# Patient Record
Sex: MALE | Race: BLACK OR AFRICAN AMERICAN | NOT HISPANIC OR LATINO | Employment: FULL TIME | URBAN - METROPOLITAN AREA
[De-identification: names, ages, dates, MRNs, and addresses within clinical notes are randomized per-mention and may not be internally consistent; named-entity substitution may affect disease eponyms.]

---

## 2024-02-20 ENCOUNTER — OFFICE VISIT (OUTPATIENT)
Dept: URGENT CARE | Facility: URGENT CARE | Age: 64
End: 2024-02-20
Payer: COMMERCIAL

## 2024-02-20 VITALS
HEART RATE: 118 BPM | WEIGHT: 184.25 LBS | RESPIRATION RATE: 20 BRPM | OXYGEN SATURATION: 95 % | SYSTOLIC BLOOD PRESSURE: 175 MMHG | TEMPERATURE: 100.4 F | DIASTOLIC BLOOD PRESSURE: 98 MMHG

## 2024-02-20 DIAGNOSIS — R06.2 WHEEZING: Primary | ICD-10-CM

## 2024-02-20 DIAGNOSIS — R05.1 ACUTE COUGH: ICD-10-CM

## 2024-02-20 LAB
FLUAV AG SPEC QL IA: NEGATIVE
FLUBV AG SPEC QL IA: NEGATIVE

## 2024-02-20 PROCEDURE — 99203 OFFICE O/P NEW LOW 30 MIN: CPT

## 2024-02-20 PROCEDURE — 87635 SARS-COV-2 COVID-19 AMP PRB: CPT

## 2024-02-20 PROCEDURE — 87804 INFLUENZA ASSAY W/OPTIC: CPT

## 2024-02-20 RX ORDER — LOSARTAN POTASSIUM 50 MG/1
50 TABLET ORAL DAILY
COMMUNITY
Start: 2023-10-10

## 2024-02-20 RX ORDER — SIMVASTATIN 20 MG
20 TABLET ORAL AT BEDTIME
COMMUNITY
Start: 2023-07-06

## 2024-02-20 RX ORDER — LEVALBUTEROL TARTRATE 45 UG/1
2 AEROSOL, METERED ORAL EVERY 6 HOURS PRN
Qty: 15 G | Refills: 0 | Status: SHIPPED | OUTPATIENT
Start: 2024-02-20

## 2024-02-20 RX ORDER — BENZONATATE 200 MG/1
200 CAPSULE ORAL 3 TIMES DAILY PRN
Qty: 21 CAPSULE | Refills: 0 | Status: SHIPPED | OUTPATIENT
Start: 2024-02-20

## 2024-02-20 NOTE — PATIENT INSTRUCTIONS
Influenza test is negative.  COVID test is pending.  We will contact you within 1-2 business days if it is positive.  Take benzonatate for the cough.  Use the levalbuterol inhaler for the wheezing.  You can also use a humidifier and/or steam to help with your symptoms.  Go to the emergency department with any worsening wheezing, shortness of breath and/or chest tightness.

## 2024-02-20 NOTE — PROGRESS NOTES
ASSESSMENT:  (R06.2) Wheezing  (primary encounter diagnosis)  Plan: levalbuterol (XOPENEX HFA) 45 MCG/ACT inhaler    (R05.1) Acute cough  Plan: Symptomatic COVID-19 Virus (Coronavirus) by PCR        Nose, Influenza A & B Antigen - Clinic Collect,        benzonatate (TESSALON) 200 MG capsule    PLAN:  Informed the patient that the influenza test is negative and the COVID test is pending.  We discussed that we will contact him within 1-2 business days if the COVID test is positive.  We also discussed taking benzonatate as prescribed for the cough and using the leave albuterol inhaler for the wheezing.  Informed him he can use a humidifier and/or steam to help with the symptoms as well.  Discussed the need to follow-up with his primary care provider should symptoms persist and/or related to his elevated blood pressure.  Instructed the patient to go to the emergency department with any worsening wheezing, shortness of breath and/or chest tightness.  Patient acknowledged his understanding of the above plan.    The use of Dragon/Undesk dictation services may have been used to construct the content in this note; any grammatical or spelling errors are non-intentional. Please contact the author of this note directly if you are in need of any clarification.      KATERINA Souza CNP      SUBJECTIVE:  Hussein Jefferson is a 64 year old male who presents to the clinic today with a chief complaint of cough  for 4 day(s).  His cough is described as dry.    The patient's symptoms are severe and worsening.  Associated symptoms include fever and wheezing. The patient's symptoms are exacerbated by laughing  Patient has been using Mucinex and NyQuil  to improve symptoms.    At home COVID test negative.    ROS  Negative except noted above.     OBJECTIVE:  BP (!) 175/98   Pulse 118   Temp 100.4  F (38  C)   Resp 20   Wt 83.6 kg (184 lb 4 oz)   SpO2 95%   GENERAL APPEARANCE: healthy, alert and no distress  EYES: EOMI,   PERRL, conjunctiva clear  HENT: ear canals and TM's normal.  Nose and mouth without ulcers, erythema or lesions  NECK: supple, nontender, no lymphadenopathy  RESP: inspiratory and expiratory wheezes bilateral  CV: regular rates and rhythm, normal S1 S2, no murmur noted  SKIN: no suspicious lesions or rashes

## 2024-02-21 LAB — SARS-COV-2 RNA RESP QL NAA+PROBE: NEGATIVE

## 2024-03-13 DIAGNOSIS — R06.2 WHEEZING: ICD-10-CM

## 2024-03-13 RX ORDER — LEVALBUTEROL TARTRATE 45 UG/1
2 AEROSOL, METERED ORAL EVERY 6 HOURS PRN
Qty: 15 G | Refills: 0 | Status: CANCELLED | OUTPATIENT
Start: 2024-03-13

## 2024-03-13 NOTE — TELEPHONE ENCOUNTER
Called pharmacy to clarify Levalbuterol refill request, looks like patient has only been seen at [facility] once and is not established at  primary care. Writer wondering if med is on auto refill or if patient themselves requested refill - in that case, writer would call and triage symptoms if still needing prn inhaler.    Pharm tech reviewed their documentation and it looks like this med was on auto refill for some reason - med wasn't requested to be refilled by patient. Will delete auto refill in their system, no refill needed. Will close encounter.      Chayito Dean, ROSEMARYN, RN  [facility]